# Patient Record
(demographics unavailable — no encounter records)

---

## 2024-11-18 NOTE — HISTORY OF PRESENT ILLNESS
[Parent] : parent [de-identified] : 20 yo male with HGB SS for initial visit. Requires .  last hospitalization- 2 mos ago, [facility], c/o long-standing dyspnea, no CP, no h/o heart dz PSH- none allergies-none no other PMH fx- brother with SCD soc: finished HS in [place], would like to go to colllege in [place] in marketing  PE: gen- thin male in nad vss, O2 sat 99% anicteric lungs- cta cor: rrr-m abd- benign ext:-c/c/e, no ulcers a/P- 20 yo male with HGB SS, for new patiebnt visit 1. increase HU to 1000 mg QD 2. tramadol- 50 mg tab- #30 ISTOP checked 3. ophtho referral, echo referral 4. prevnar, flu shot 5. f/u one month  I. Rachel AMBRIZ + c/o long-standing dyspnea, CP tramadol 50 mg tab- #30 ISTOP checked prevnar, flu shot Increase HU to 1000 mg QD

## 2025-01-02 NOTE — HISTORY OF PRESENT ILLNESS
[de-identified] : 20 yo male with HGB SS for initial visit. Requires .  last hospitalization- 2 mos ago, [facility], c/o long-standing dyspnea, no CP, no h/o heart dz PSH- none allergies-none no other PMH fx- brother with SCD soc: finished HS in [place], would like to go to colllege in [place] in marketing  PE: gen- thin male in nad vss, O2 sat 99% anicteric lungs- cta cor: rrr-m abd- benign ext:-c/c/e, no ulcers a/P- 20 yo male with HGB SS, for new patiebnt visit 1. increase HU to 1000 mg QD 2. tramadol- 50 mg tab- #30 ISTOP checked 3. ophtho referral, echo referral 4. prevnar, flu shot 5. f/u one month  I. Rachel AMBRIZ + c/o long-standing dyspnea, CP tramadol 50 mg tab- #30 ISTOP checked prevnar, flu shot Increase HU to 1000 mg QD [Parent] : parent

## 2025-01-16 NOTE — HISTORY OF PRESENT ILLNESS
[Parent] : parent [de-identified] : 18 yo male with HGB SS for initial visit. Requires .  last hospitalization- 12/27/24-12/31/24 for leg pain. He had been hospitalized 3 months prior for dyspnea.   [facility], c/o long-standing dyspnea, no CP, no h/o heart dz PSH- none allergies-none no other PMH fx- brother with SCD soc: finished HS in [place], would like to go to [employer] in [place] in marketing  PE: gen- thin male in nad vss, O2 sat 99% anicteric lungs- cta cor: rrr-m abd- benign ext:-c/c/e, no ulcers a/P- 18 yo male with HGB SS, for new patiebnt visit 1. increase HU to 1000 mg QD 2. tramadol- 50 mg tab- #30 ISTOP checked 3. ophtho referral, echo referral 4. prevnar, flu shot 5. f/u one month  I. Rachel AMBRIZ + c/o long-standing dyspnea, CP tramadol 50 mg tab- #30 ISTOP checked prevnar, flu shot Increase HU to 1000 mg QD

## 2025-02-24 NOTE — HISTORY OF PRESENT ILLNESS
[Parent] : parent [de-identified] : 20 yo male with HGB SS for initial visit. Requires .  last hospitalization- 2 mos ago, [facility], c/o long-standing dyspnea, no CP, no h/o heart dz The patient had a one week episode of dizziness this past month. He had increased HU to 41781 mg QD 6 weeks ago, has had one episode of SCD pain lasting 2 hours (relieved with tylenol and motrin). echo was performed 1/28/25 No evidence of PH, EF 62%  PSH- none allergies-none no other PMH fx- brother with SCD soc: finished HS in [place], would like to go to [employer] in [employer] in marketing, has started online studies  PE: gen- thin male in nad vss, O2 sat 96% anicteric lungs- cta cor: rrr-m abd- benign ext:-c/c/e, no ulcers neuro- A and O x 3 CN intact experiences reproducible dizziness when he turns his head from side to side, no dizziness with bending neck forward or backward CB intact motor strength U and L, 5/5/ bilaterally sensory intact  a/P- 20 yo male with HGB SS, for f/u 1. SCD- continue HU 1000 mg QD, patient is having fewer pain episodes 2. routine labs 3. ophtho-pending 4. dizziness- likely BPV, however, will need to evaluate Head/neck blood vessels MRI/MRA head/neck ordered 5. f/u two months  MAMIE Ramos MD + c/o long-standing dyspnea, CP tramadol 50 mg tab- #30 ISTOP checked prevnar, flu shot Increase HU to 1000 mg QD MRA/MRI head

## 2025-04-07 NOTE — PHYSICAL EXAM
[No Acute Distress] : no acute distress [Well-Appearing] : well-appearing [Normal Sclera/Conjunctiva] : normal sclera/conjunctiva [EOMI] : extraocular movements intact [Normal Outer Ear/Nose] : the outer ears and nose were normal in appearance [Supple] : supple [No Respiratory Distress] : no respiratory distress  [No Accessory Muscle Use] : no accessory muscle use [Normal Rate] : normal rate  [Regular Rhythm] : with a regular rhythm [No Edema] : there was no peripheral edema [Soft] : abdomen soft [Non-distended] : non-distended [Grossly Normal Strength/Tone] : grossly normal strength/tone [Coordination Grossly Intact] : coordination grossly intact [No Focal Deficits] : no focal deficits [Normal Gait] : normal gait [Normal Affect] : the affect was normal [Normal Insight/Judgement] : insight and judgment were intact

## 2025-04-11 NOTE — HISTORY OF PRESENT ILLNESS
[de-identified] : Patient is a 19 year old male with past medical history of sickle cell anemia who presents to the clinic for a 6 month follow up appointment. He states he has been feeling well. He has been seeing Dr. Ramos regularly for management of his sickle cell. He notes one prior pain crisis in 12/2024 where he was hospitalized and one last month that he was able to manage himself at home. He states he has occasional episodes of vertigo that have been occurring for 2 years intermittently and last for a week or two whenever he moves his head or changes positions. No current vertigo. He does not have any headache, vision changes, or changes in hearing during vertigo episodes. Currently denies any symptoms. Patient is not sexually active. He notes he eats lots of fruit and vegetables and states he eats healthily, though not as much food as he thinks he should.

## 2025-04-11 NOTE — HISTORY OF PRESENT ILLNESS
[de-identified] : Patient is a 19 year old male with past medical history of sickle cell anemia who presents to the clinic for a 6 month follow up appointment. He states he has been feeling well. He has been seeing Dr. Ramos regularly for management of his sickle cell. He notes one prior pain crisis in 12/2024 where he was hospitalized and one last month that he was able to manage himself at home. He states he has occasional episodes of vertigo that have been occurring for 2 years intermittently and last for a week or two whenever he moves his head or changes positions. No current vertigo. He does not have any headache, vision changes, or changes in hearing during vertigo episodes. Currently denies any symptoms. Patient is not sexually active. He notes he eats lots of fruit and vegetables and states he eats healthily, though not as much food as he thinks he should.

## 2025-04-11 NOTE — END OF VISIT
[] : Resident [FreeTextEntry3] : reviewed MRI findings - 1mm infundibulum; not an aneurysm and so likely does not warrant follow up.

## 2025-04-11 NOTE — ASSESSMENT
[FreeTextEntry1] : Patient is a 19 year old male with past medical history of sickle cell anemia who presents to the clinic for a 6 month follow up appointment.   #Sickle cell anemia - continue with hydroxyurea 1000 mg QD - continues to follow with Dr. Ramos  #BPPV - vertigo consistent with BPPV - had MRI/MRA 3/13/25 which showed generally normal with no acute intracranial hemorrhage or acute infarct; nonspecific minimal white matter changes; 1 mm outpouching at the region of the junction of A1 and A2 segments of left anterior cerebral artery likely representing infundibulum - educated patient on Epley maneuver he can do at home and see if it resolves his vertigo episodes when he next gets them  #health maintenance - obtained HIV and Hep C screening  RTC in 6 months for CPE

## 2025-04-11 NOTE — HISTORY OF PRESENT ILLNESS
[de-identified] : Patient is a 19 year old male with past medical history of sickle cell anemia who presents to the clinic for a 6 month follow up appointment. He states he has been feeling well. He has been seeing Dr. Ramos regularly for management of his sickle cell. He notes one prior pain crisis in 12/2024 where he was hospitalized and one last month that he was able to manage himself at home. He states he has occasional episodes of vertigo that have been occurring for 2 years intermittently and last for a week or two whenever he moves his head or changes positions. No current vertigo. He does not have any headache, vision changes, or changes in hearing during vertigo episodes. Currently denies any symptoms. Patient is not sexually active. He notes he eats lots of fruit and vegetables and states he eats healthily, though not as much food as he thinks he should.

## 2025-04-27 NOTE — HISTORY OF PRESENT ILLNESS
[Parent] : parent [de-identified] : 20 yo male with HGB SS for f/u. Requires .  last hospitalization- 4 mos ago, [facility], c/o long-standing dyspnea, no CP, no h/o heart dz BPV noted at last visit has essentially resolved.  He had increased HU to 1000 mg QD 3.5 weeks ago, has had one ED visit for SCD pain 4 days ago. Today, the patient is doing well, has no c/o SCD pain.  echo was performed 1/28/25- WNL  No evidence of PH, EF 62% MRA brain performed for BPV- small infundibulum noted, as also seen on CT angio 9/24. Otherwise, a normal exam ophtho -4/4/25- retinal scars consistent with SCD noted- recommendation to f/u with retina specialist was made  PSH- none allergies-none no other PMH fx- brother with SCD soc: finished HS in [place], would like to go to [employer] in [place] in marketing, has started online studies  PE: gen- thin male in nad vss, O2 sat 98%, 120/60 anicteric lungs- cta cor: rrr-m abd- benign ext:-c/c/e, no ulcers neuro- grossly WNL  labs- 4/17/25 HGB 7.9, ANC 7.89, plts 239 CMP- without significant abnormality   a/P- 20 yo male with HGB SS, for f/u 1. SCD- continue HU 1000 mg QD, patient is having fewer pain episodes 2. occasional pain- tramadol #30 tabs ISTOP checked  3. ophtho- UTD- f/u for routine ophth one year, to be seen by retinal specialist 4. BPV- resolved 5. f/u two months  MAMIE Ramos MD

## 2025-04-27 NOTE — HISTORY OF PRESENT ILLNESS
[Parent] : parent [de-identified] : 18 yo male with HGB SS for f/u. Requires .  last hospitalization- 4 mos ago, [facility], c/o long-standing dyspnea, no CP, no h/o heart dz BPV noted at last visit has essentially resolved.  He had increased HU to 1000 mg QD 3.5 weeks ago, has had one ED visit for SCD pain 4 days ago. Today, the patient is doing well, has no c/o SCD pain.  echo was performed 1/28/25- WNL  No evidence of PH, EF 62% MRA brain performed for BPV- small infundibulum noted, as also seen on CT angio 9/24. Otherwise, a normal exam ophtho -4/4/25- retinal scars consistent with SCD noted- recommendation to f/u with retina specialist was made  PSH- none allergies-none no other PMH fx- brother with SCD soc: finished HS in [place], would like to go to [employer] in [place] in marketing, has started online studies  PE: gen- thin male in nad vss, O2 sat 98%, 120/60 anicteric lungs- cta cor: rrr-m abd- benign ext:-c/c/e, no ulcers neuro- grossly WNL  labs- 4/17/25 HGB 7.9, ANC 7.89, plts 239 CMP- without significant abnormality   a/P- 18 yo male with HGB SS, for f/u 1. SCD- continue HU 1000 mg QD, patient is having fewer pain episodes 2. occasional pain- tramadol #30 tabs ISTOP checked  3. ophtho- UTD- f/u for routine ophth one year, to be seen by retinal specialist 4. BPV- resolved 5. f/u two months  MAMIE Ramos MD

## 2025-06-08 NOTE — HISTORY OF PRESENT ILLNESS
[de-identified] : 20 yo male with HGB SS for f/u. Today, did the interview without a , as the patient's English has improved markedly.  last hospitalization- 12/29/24. The patient is doing well, had one episode of SCD pain since his last visit 1 month ago. It required tramadol 50 mg, ibuprofen, and tylenol x 1 to resolve the pain.  The patient says that dilaudid ( IV, ED) gives him a rash. The tramadol also makes him itch (no rash, no dyspnea).  He had increased HU to 1000 mg QD 8 weeks ago, has had one ED visit for SCD pain.  echo was performed 1/28/25- WNL  No evidence of PH, EF 62% MRA brain performed for BPV- small infundibulum noted, as also seen on CT angio 9/24. 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 The patient's mother is still in [place]  PE: gen- thin male in nad vss, O2 sat 98%, 112/60 anicteric lungs- cta cor: rrr-m abd- benign ext:-c/c/e, no ulcers neuro- grossly WNL  labs- 6/5/25 HGB 7.6, ANC 3.36, plts 681 CMP- without significant abnormality ferritin 189 vit d- 29.4  a/P- 20 yo male with HGB SS, for f/u 1. SCD- continue HU 1000 mg QD, patient is having few pain episodes ( max dose per day would be 1400 mg). Might consider increasing HU at next heme visit ( 1500/1000) 2. occasional pain- tramadol #30 tabs ISTOP checked  Patient does not wish to switch narcotics at this juncture. He will take the tramadol with diphenhydramine 3. ophtho- UTD-to be seen by retinal specialist 4. f/u two months with new PCP, heme 5. continue vit d supp-50,000 iu q week  MAMIE Ramos MD

## 2025-06-08 NOTE — HISTORY OF PRESENT ILLNESS
[de-identified] : 18 yo male with HGB SS for f/u. Today, did the interview without a , as the patient's English has improved markedly.  last hospitalization- 12/29/24. The patient is doing well, had one episode of SCD pain since his last visit 1 month ago. It required tramadol 50 mg, ibuprofen, and tylenol x 1 to resolve the pain.  The patient says that dilaudid ( IV, ED) gives him a rash. The tramadol also makes him itch (no rash, no dyspnea).  He had increased HU to 1000 mg QD 8 weeks ago, has had one ED visit for SCD pain.  echo was performed 1/28/25- WNL  No evidence of PH, EF 62% MRA brain performed for BPV- small infundibulum noted, as also seen on CT angio 9/24. 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 The patient's mother is still in [place]  PE: gen- thin male in nad vss, O2 sat 98%, 112/60 anicteric lungs- cta cor: rrr-m abd- benign ext:-c/c/e, no ulcers neuro- grossly WNL  labs- 6/5/25 HGB 7.6, ANC 3.36, plts 681 CMP- without significant abnormality ferritin 189 vit d- 29.4  a/P- 18 yo male with HGB SS, for f/u 1. SCD- continue HU 1000 mg QD, patient is having few pain episodes ( max dose per day would be 1400 mg). Might consider increasing HU at next heme visit ( 1500/1000) 2. occasional pain- tramadol #30 tabs ISTOP checked  Patient does not wish to switch narcotics at this juncture. He will take the tramadol with diphenhydramine 3. ophtho- UTD-to be seen by retinal specialist 4. f/u two months with new PCP, heme 5. continue vit d supp-50,000 iu q week  MAMIE Ramos MD

## 2025-07-24 NOTE — HISTORY OF PRESENT ILLNESS
[de-identified] : 18 yo M. 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  Whose course has been complicated by:  VOC Patient presents to clinic in severe sickle pain crisis following overnight ER visit, during which he received IV Dilaudid with initial improvement. He reports recurrence of pain shortly after discharge. Rates the pain as 8/10 involving his back, shoulders and knees. This is reportedly his third acute pain crisis in the past two weeks. He [place] the [place] about one year ago, since that time has had four ER visits for VOC pain management. He [place] PRBC transf[place] in the [place], none recently. He has three siblings, including a brother with sickle cell disease receiving care at [facility], and two unaffected sisters. His sister Reena is present at today's visit.  Today's Visit:    Hospitalized since last office visit: Yes [ ] No [ ]  Date:    Transfused: Yes [ ] No [ ]    Antibiotics: Yes [ ] No [ ]     ED visit since last office visit: Yes [x ] No [ ]  Date:  7/22/2025 acute pain crisis  History:   Acute chest syndrome: Yes [ ] No [ x]   Avascular necrosis: Yes [ ] No [x ]  Location:   Cerebral vascular events: Yes [ ] No [ x]   Splenomegaly: Yes [ ] No [ x]  Splenectomy: Yes [ ] No [x ]   Priapism: Yes [ ] No [x ] Not applicable [ ]    Iron overload:  No Last ferritin: 189 ng/mL on 6/5/25 Last MRI LIC: ____   Hyperhemolysis syndrome: Yes [ ] No [x ]   Thrombosis: Yes [ ] No [ x]    If yes, still on anticoagulation? Yes [ ] No [ x]     Their disease has required:  Routine transfusions: Exchange [ x] Simple [ ] None [ ]  Established Hgb goal: ___ End HCT:  FCR:   Frequency:  as needed Last transfusion:     Pain Crisis   Triggers:   Usual Locations of Pain:      Medications:  Hydroxyurea: Yes [] No [ ]    Dose:   Crizanlizumab: Yes [ ] No [ ]   Folic acid: Yes [] No [ ]   Vitamin D: Yes [ ] No [ ]   L-glutamine: Yes [ ] No [ ]      Chronic Short Acting Analgesics: Yes [ x] No [ ]    Oxycodone Dose:   Hydromorphone Dose:     Morphine IR Dose:   Tramadol 50mg every 8hrs PRN (causes diffuse pruritus, no rash)  Long Acting Analgesics: Yes [ ] No [x]    Methadone Dose:   Morphine ER Dose:    Oxycodone ER(Oxycontin) Dose:  Buprenorphine(patch/sublingual) Dose:     Iron Chelation:  no Deferasirox (Jadenu): Yes [ ] No [ ]  Dose:   Deferiprone (Ferriprox): Yes [ ] No [ ]  Dose:  Deferoxamine (Desferal): Yes [ ] No [ ]  Dose:    Other Medications:      Team:   Cardiology:   Endocrinology:   Fertility:   Genetic Counseling:  ID:   Nephrology:   Neurosurgery:   Ophthalmology:   Ortho:   Vascular Surgery:

## 2025-07-24 NOTE — PHYSICAL EXAM
[Normal] : normal appearance, no rash, nodules, vesicles, ulcers, erythema [de-identified] : uncomfortable, distressed c/w reported pain

## 2025-07-24 NOTE — HISTORY OF PRESENT ILLNESS
[de-identified] : 18 yo M. 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  Whose course has been complicated by:  VOC Patient presents to clinic in severe sickle pain crisis following overnight ER visit, during which he received IV Dilaudid with initial improvement. He reports recurrence of pain shortly after discharge. Rates the pain as 8/10 involving his back, shoulders and knees. This is reportedly his third acute pain crisis in the past two weeks. [place] about one year ago, since that time has had four ER visits for VOC pain management. [place] PRBC [place], [place] recently. He has three siblings, including a brother with sickle cell disease receiving care at [facility], and two unaffected sisters. His sister Reena is present at today's visit.  Today's Visit:    Hospitalized since last office visit: Yes [ ] No [ ]  Date:    Transfused: Yes [ ] No [ ]    Antibiotics: Yes [ ] No [ ]     ED visit since last office visit: Yes [x ] No [ ]  Date:  7/22/2025 acute pain crisis  History:   Acute chest syndrome: Yes [ ] No [ x]   Avascular necrosis: Yes [ ] No [x ]  Location:   Cerebral vascular events: Yes [ ] No [ x]   Splenomegaly: Yes [ ] No [ x]  Splenectomy: Yes [ ] No [x ]   Priapism: Yes [ ] No [x ] Not applicable [ ]    Iron overload:  No Last ferritin: 189 ng/mL on 6/5/25 Last MRI LIC: ____   Hyperhemolysis syndrome: Yes [ ] No [x ]   Thrombosis: Yes [ ] No [ x]    If yes, still on anticoagulation? Yes [ ] No [ x]     Their disease has required:  Routine transfusions: Exchange [ x] Simple [ ] None [ ]  Established Hgb goal: ___ End HCT:  FCR:   Frequency:  as needed Last transfusion:     Pain Crisis   Triggers:   Usual Locations of Pain:      Medications:  Hydroxyurea: Yes [] No [ ]    Dose:   Crizanlizumab: Yes [ ] No [ ]   Folic acid: Yes [] No [ ]   Vitamin D: Yes [ ] No [ ]   L-glutamine: Yes [ ] No [ ]      Chronic Short Acting Analgesics: Yes [ x] No [ ]    Oxycodone Dose:   Hydromorphone Dose:     Morphine IR Dose:   Tramadol 50mg every 8hrs PRN (causes diffuse pruritus, no rash)  Long Acting Analgesics: Yes [ ] No [x]    Methadone Dose:   Morphine ER Dose:    Oxycodone ER(Oxycontin) Dose:  Buprenorphine(patch/sublingual) Dose:     Iron Chelation:  no Deferasirox (Jadenu): Yes [ ] No [ ]  Dose:   Deferiprone (Ferriprox): Yes [ ] No [ ]  Dose:  Deferoxamine (Desferal): Yes [ ] No [ ]  Dose:    Other Medications:      Team:   Cardiology:   Endocrinology:   Fertility:   Genetic Counseling:  ID:   Nephrology:   Neurosurgery:   Ophthalmology:   Ortho:   Vascular Surgery:

## 2025-07-24 NOTE — PHYSICAL EXAM
[Normal] : normal appearance, no rash, nodules, vesicles, ulcers, erythema [de-identified] : uncomfortable, distressed c/w reported pain

## 2025-07-24 NOTE — HISTORY OF PRESENT ILLNESS
[de-identified] : 18 yo M. 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  Whose course has been complicated by:  VOC Patient presents to clinic in severe sickle pain crisis following overnight ER visit, during which he received IV Dilaudid with initial improvement. He reports recurrence of pain shortly after discharge. Rates the pain as 8/10 involving his back, shoulders and knees. This is reportedly his third acute pain crisis in the past two weeks. [place] about one year ago, since that time has had four ER visits for VOC pain management. [place] PRBC [place], none recently. He has three siblings, including a brother with sickle cell disease receiving care at [facility], and two unaffected sisters. His sister Reena is present at today's visit.  Today's Visit:    Hospitalized since last office visit: Yes [ ] No [ ]  Date:    Transfused: Yes [ ] No [ ]    Antibiotics: Yes [ ] No [ ]     ED visit since last office visit: Yes [x ] No [ ]  Date:  7/22/2025 acute pain crisis  History:   Acute chest syndrome: Yes [ ] No [ x]   Avascular necrosis: Yes [ ] No [x ]  Location:   Cerebral vascular events: Yes [ ] No [ x]   Splenomegaly: Yes [ ] No [ x]  Splenectomy: Yes [ ] No [x ]   Priapism: Yes [ ] No [x ] Not applicable [ ]    Iron overload:  No Last ferritin: 189 ng/mL on 6/5/25 Last MRI LIC: ____   Hyperhemolysis syndrome: Yes [ ] No [x ]   Thrombosis: Yes [ ] No [ x]    If yes, still on anticoagulation? Yes [ ] No [ x]     Their disease has required:  Routine transfusions: Exchange [ x] Simple [ ] None [ ]  Established Hgb goal: ___ End HCT:  FCR:   Frequency:  as needed Last transfusion:     Pain Crisis   Triggers:   Usual Locations of Pain:      Medications:  Hydroxyurea: Yes [] No [ ]    Dose:   Crizanlizumab: Yes [ ] No [ ]   Folic acid: Yes [] No [ ]   Vitamin D: Yes [ ] No [ ]   L-glutamine: Yes [ ] No [ ]      Chronic Short Acting Analgesics: Yes [ x] No [ ]    Oxycodone Dose:   Hydromorphone Dose:     Morphine IR Dose:   Tramadol 50mg every 8hrs PRN (causes diffuse pruritus, no rash)  Long Acting Analgesics: Yes [ ] No [x]    Methadone Dose:   Morphine ER Dose:    Oxycodone ER(Oxycontin) Dose:  Buprenorphine(patch/sublingual) Dose:     Iron Chelation:  no Deferasirox (Jadenu): Yes [ ] No [ ]  Dose:   Deferiprone (Ferriprox): Yes [ ] No [ ]  Dose:  Deferoxamine (Desferal): Yes [ ] No [ ]  Dose:    Other Medications:      Team:   Cardiology:   Endocrinology:   Fertility:   Genetic Counseling:  ID:   Nephrology:   Neurosurgery:   Ophthalmology:   Ortho:   Vascular Surgery:

## 2025-07-24 NOTE — REVIEW OF SYSTEMS
[Negative] : Integumentary [Fever] : no fever [Chills] : no chills [Night Sweats] : no night sweats [Eye Pain] : no eye pain [FreeTextEntry9] : back, shoulders, knees

## 2025-07-24 NOTE — ASSESSMENT
[FreeTextEntry1] : 18 yo M. 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  Whose course has been complicated by:  VOC presents to clinic in severe sickle pain crisis involving back, shoulders and knees has had three acute pain crisis in the past two weeks and four ER visits for VOC management in the past year has had PRBC transfusions in the [place]   Plan:  Heme:   Continue Disease Modifying Medication: Yes [x] No [ ]   Increase Hydrea to 1500mg daily  No Crizanlizumab (Adakveo)  No L-glutamine  Transfuse Yes [ ] No [x ]  If Yes Simple [ ] or Exchange [ ]   Frequency:   Infusion Center Appointment for Crisis Yes [ ] No [x ] If yes, Planned Medications_____   Eligible for Transplant/Gene Therapy Yes [ x] No [ ]  Yes: Referred to _____ Date_____  No:     Cardiology   Endocrinology   Fertility   Genetic Counseling   Nephrology: Last Microalbumin Date ___   Ophthalmology: last evaluation (dilated eye exam) Date 4/2025 retinal scars; repeat yearly   Health Care Maintenance: Pneumonia vaccine (PCV20) Yes [x ] 11/18/24 Influenza vaccine Yes [ ] - date No [ ]   Adebayo is in painful distress IV opiates today Simple RBC transfusion request for tomorrow. d/c Tramadol, start Oxycodone 5mg every 4-6 hours PRN  Over 55 minutes were spent in direct patient care and care coordination and in conversation with his sister.

## 2025-07-24 NOTE — REASON FOR VISIT
[Initial Consultation] : an initial consultation for [Other: _____] : [unfilled] [Language Line ] : provided by [employer]   [FreeTextEntry2] : Sickle cell disease [Interpreters_IDNumber] : [id number]

## 2025-07-24 NOTE — ASSESSMENT
[FreeTextEntry1] : 18 yo M. 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  Whose course has been complicated by:  VOC presents to clinic in severe sickle pain crisis involving back, shoulders and knees has had three acute pain crisis in the past two weeks and four ER visits for VOC management in the past year has had PRBC transfusions in the [place]   Plan:  Heme:   Continue Disease Modifying Medication: Yes [x] No [ ]   Increase Hydrea to 1500mg daily  No Crizanlizumab (Adakveo)  No L-glutamine  Transfuse Yes [ ] No [x ]  If Yes Simple [ ] or Exchange [ ]   Frequency:   Infusion Center Appointment for Crisis Yes [ ] No [x ] If yes, Planned Medications_____   Eligible for Transplant/Gene Therapy Yes [ x] No [ ]  Yes: Referred to _____ Date_____  No:     Cardiology   Endocrinology   Fertility   Genetic Counseling   Nephrology: Last Microalbumin Date ___   Ophthalmology: last evaluation (dilated eye exam) Date 4/2025 retinal scars; repeat yearly   Health Care Maintenance: Pneumonia vaccine (PCV20) Yes [x ] 11/18/24 Influenza vaccine Yes [ ] - date No [ ]   Adebayo is in painful distress IV opiates today Simple RBC transfusion request for tomorrow. d/c Tramadol, start Oxycodone 5mg every 4-6 hours PRN  Over 55 minutes were spent in direct patient care and care coordination and in conversation with his sister.

## 2025-07-24 NOTE — REASON FOR VISIT
[Initial Consultation] : an initial consultation for [Other: _____] : [unfilled] [Language Line ] : provided by Language Line   [FreeTextEntry2] : Sickle cell disease [Interpreters_IDNumber] : [id number]

## 2025-07-24 NOTE — REASON FOR VISIT
[Initial Consultation] : an initial consultation for [Other: _____] : [unfilled] [Language Line ] : provided by Language [employer]   [FreeTextEntry2] : Sickle cell disease [Interpreters_IDNumber] : [id number]

## 2025-07-24 NOTE — PHYSICAL EXAM
[Normal] : normal appearance, no rash, nodules, vesicles, ulcers, erythema [de-identified] : uncomfortable, distressed c/w reported pain

## 2025-07-24 NOTE — ASSESSMENT
[FreeTextEntry1] : 18 yo M. 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  Whose course has been complicated by:  VOC presents to clinic in severe sickle pain crisis involving back, shoulders and knees has had three acute pain crisis in the past two weeks and four ER visits for VOC management in the past year has had PRBC transfusions in the [place]   Plan:  Heme:   Continue Disease Modifying Medication: Yes [x] No [ ]   Increase Hydrea to 1500mg daily  No Crizanlizumab (Adakveo)  No L-glutamine  Transfuse Yes [ ] No [x ]  If Yes Simple [ ] or Exchange [ ]   Frequency:   Infusion Center Appointment for Crisis Yes [ ] No [x ] If yes, Planned Medications_____   Eligible for Transplant/Gene Therapy Yes [ x] No [ ]  Yes: Referred to _____ Date_____  No:     Cardiology   Endocrinology   Fertility   Genetic Counseling   Nephrology: Last Microalbumin Date ___   Ophthalmology: last evaluation (dilated eye exam) Date 4/2025 retinal scars; repeat yearly   Health Care Maintenance: Pneumonia vaccine (PCV20) Yes [x ] 11/18/24 Influenza vaccine Yes [ ] - date No [ ]   Adebayo is in painful distress IV opiates today Simple RBC transfusion request for tomorrow. d/c Tramadol, start Oxycodone 5mg every 4-6 hours PRN  Over 55 minutes were spent in direct patient care and care coordination and in conversation with his sister.